# Patient Record
Sex: MALE | Race: WHITE | NOT HISPANIC OR LATINO | Employment: OTHER | URBAN - METROPOLITAN AREA
[De-identification: names, ages, dates, MRNs, and addresses within clinical notes are randomized per-mention and may not be internally consistent; named-entity substitution may affect disease eponyms.]

---

## 2021-02-13 ENCOUNTER — OFFICE VISIT (OUTPATIENT)
Dept: FAMILY MEDICINE | Facility: CLINIC | Age: 61
End: 2021-02-13
Payer: COMMERCIAL

## 2021-02-13 VITALS
DIASTOLIC BLOOD PRESSURE: 72 MMHG | OXYGEN SATURATION: 98 % | WEIGHT: 170.8 LBS | BODY MASS INDEX: 27.45 KG/M2 | TEMPERATURE: 97.6 F | HEIGHT: 66 IN | SYSTOLIC BLOOD PRESSURE: 143 MMHG | HEART RATE: 64 BPM

## 2021-02-13 DIAGNOSIS — K63.5 POLYP OF COLON, UNSPECIFIED PART OF COLON, UNSPECIFIED TYPE: ICD-10-CM

## 2021-02-13 DIAGNOSIS — Z00.00 ENCOUNTER FOR PREVENTIVE CARE: Primary | ICD-10-CM

## 2021-02-13 DIAGNOSIS — N40.0 BENIGN PROSTATIC HYPERPLASIA WITHOUT LOWER URINARY TRACT SYMPTOMS: ICD-10-CM

## 2021-02-13 PROCEDURE — 99386 PREV VISIT NEW AGE 40-64: CPT | Mod: 25 | Performed by: FAMILY MEDICINE

## 2021-02-13 PROCEDURE — 90750 HZV VACC RECOMBINANT IM: CPT | Performed by: FAMILY MEDICINE

## 2021-02-13 PROCEDURE — 90715 TDAP VACCINE 7 YRS/> IM: CPT | Performed by: FAMILY MEDICINE

## 2021-02-13 PROCEDURE — 90471 IMMUNIZATION ADMIN: CPT | Performed by: FAMILY MEDICINE

## 2021-02-13 PROCEDURE — 90472 IMMUNIZATION ADMIN EACH ADD: CPT | Performed by: FAMILY MEDICINE

## 2021-02-13 PROCEDURE — 36415 COLL VENOUS BLD VENIPUNCTURE: CPT | Performed by: FAMILY MEDICINE

## 2021-02-13 RX ORDER — NYSTATIN 100000 [USP'U]/ML
SUSPENSION ORAL
COMMUNITY
Start: 2021-01-26 | End: 2021-06-23

## 2021-02-13 RX ORDER — ALFUZOSIN HYDROCHLORIDE 10 MG/1
10 TABLET, EXTENDED RELEASE ORAL
COMMUNITY
Start: 2021-01-30

## 2021-02-13 ASSESSMENT — ENCOUNTER SYMPTOMS
FATIGUE: 0
BACK PAIN: 0
ARTHRALGIAS: 0
CONSTIPATION: 0
HEADACHES: 0
BRUISES/BLEEDS EASILY: 0
SHORTNESS OF BREATH: 0
NERVOUS/ANXIOUS: 0
POLYPHAGIA: 0
DIARRHEA: 0
POLYDIPSIA: 0
DIFFICULTY URINATING: 0
BLOOD IN STOOL: 0

## 2021-02-13 NOTE — PROGRESS NOTES
[address] [address]  [phone]       Reason for visit:   Chief Complaint   Patient presents with   • NPV- CPE      HPI   Elmer Pardo is a 60 y.o. male who presents with CPE. Her has BPH and colon polyps. He has a history of palpitations. He exercises regularly. 1-2 drinks per day.      Past Medical History:   Diagnosis Date   • Enlarged prostate      Past Surgical History:   Procedure Laterality Date   • ANTERIOR CRUCIATE LIGAMENT REPAIR     • KNEE CARTILAGE SURGERY Left    • KNEE SURGERY       Social History     Tobacco Use   • Smoking status: Former Smoker   • Smokeless tobacco: Never Used   Substance Use Topics   • Alcohol use: Yes   • Drug use: Yes     Types: Marijuana      Family History   Family history unknown: Yes     Patient has no known allergies.  Current Outpatient Medications   Medication Sig Dispense Refill   • alfuzosin (UROXATRAL) 10 mg 24 hr tablet Take 10 mg by mouth once daily.     • nystatin (MYCOSTATIN) 100,000 unit/mL suspension SWALLOW 5 MLS 4 TIMES A DAY       No current facility-administered medications for this visit.        Patient Active Problem List    Diagnosis Date Noted   • Benign prostatic hyperplasia without lower urinary tract symptoms 02/13/2021   • Encounter for preventive care 02/13/2021   • Colon polyp 02/13/2021         Review of Systems   Constitutional: Negative for fatigue.   HENT: Positive for hearing loss.    Eyes: Negative for visual disturbance.   Respiratory: Negative for shortness of breath.    Cardiovascular: Negative for chest pain.   Gastrointestinal: Negative for blood in stool, constipation and diarrhea.   Endocrine: Negative for polydipsia and polyphagia.   Genitourinary: Negative for difficulty urinating.   Musculoskeletal: Negative for arthralgias and back pain.   Skin: Negative for rash.   Neurological: Negative for headaches.   Hematological: Does not bruise/bleed easily.  "  Psychiatric/Behavioral: The patient is not nervous/anxious.      Objective   Vitals:    02/13/21 0936   BP: (!) 143/72   BP Location: Right upper arm   Patient Position: Sitting   Pulse: 64   Temp: 36.4 °C (97.6 °F)   TempSrc: Temporal   SpO2: 98%   Weight: 77.5 kg (170 lb 12.8 oz)   Height: 1.676 m (5' 6\")     Body mass index is 27.57 kg/m².  Physical Exam  Constitutional:       Appearance: Normal appearance.   HENT:      Right Ear: Tympanic membrane and ear canal normal.      Left Ear: Tympanic membrane and ear canal normal.   Eyes:      Extraocular Movements: Extraocular movements intact.      Conjunctiva/sclera: Conjunctivae normal.      Pupils: Pupils are equal, round, and reactive to light.   Neck:      Musculoskeletal: Normal range of motion.      Thyroid: No thyromegaly.      Vascular: No carotid bruit.   Cardiovascular:      Rate and Rhythm: Normal rate and regular rhythm.      Pulses: Normal pulses.      Heart sounds: Normal heart sounds.   Pulmonary:      Effort: Pulmonary effort is normal.      Breath sounds: Normal breath sounds.   Abdominal:      General: Abdomen is flat. Bowel sounds are normal.      Palpations: Abdomen is soft. There is no hepatomegaly or splenomegaly.   Musculoskeletal: Normal range of motion.      Right lower leg: No edema.      Left lower leg: No edema.   Lymphadenopathy:      Cervical: No cervical adenopathy.   Skin:     General: Skin is warm and dry.   Neurological:      General: No focal deficit present.      Mental Status: He is alert and oriented to person, place, and time.   Psychiatric:         Mood and Affect: Mood normal.         Behavior: Behavior normal.         Thought Content: Thought content normal.         Judgment: Judgment normal.         Procedures    No results found for: WBC, HGB, HCT, PLT, CHOL, TRIG, HDL, LDLCALC, ALT, AST, NA, K, CL, CREATININE, BUN, CO2, TSH, PSA, INR, GLUCOSE, HGBA1C, HEPCAB, MICROALBUR        Assessment   Problem List Items Addressed " This Visit        Digestive    Colon polyp     Colonoscopy - Dr German         Relevant Orders    Ambulatory referral to Gastroenterology       Genitourinary    Benign prostatic hyperplasia without lower urinary tract symptoms     Follow with urology  PSA         Relevant Orders    PSA       Other    Encounter for preventive care - Primary     Labs  Tdap  Shinmgrix         Relevant Orders    CBC and Differential    Comprehensive metabolic panel    Lipid panel    PSA    Hemoglobin A1c              Harry A. Frankel, MD  2/13/2021

## 2021-02-14 LAB
ALBUMIN SERPL-MCNC: 4.4 G/DL (ref 3.8–4.9)
ALBUMIN/GLOB SERPL: 2.3 {RATIO} (ref 1.2–2.2)
ALP SERPL-CCNC: 63 IU/L (ref 39–117)
ALT SERPL-CCNC: 24 IU/L (ref 0–44)
AST SERPL-CCNC: 21 IU/L (ref 0–40)
BASOPHILS # BLD AUTO: 0 X10E3/UL (ref 0–0.2)
BASOPHILS NFR BLD AUTO: 0 %
BILIRUB SERPL-MCNC: 0.5 MG/DL (ref 0–1.2)
BUN SERPL-MCNC: 17 MG/DL (ref 6–24)
BUN/CREAT SERPL: 19 (ref 9–20)
CALCIUM SERPL-MCNC: 9.6 MG/DL (ref 8.7–10.2)
CHLORIDE SERPL-SCNC: 101 MMOL/L (ref 96–106)
CHOLEST SERPL-MCNC: 211 MG/DL (ref 100–199)
CO2 SERPL-SCNC: 25 MMOL/L (ref 20–29)
CREAT SERPL-MCNC: 0.88 MG/DL (ref 0.76–1.27)
EOSINOPHIL # BLD AUTO: 0.1 X10E3/UL (ref 0–0.4)
EOSINOPHIL NFR BLD AUTO: 2 %
ERYTHROCYTE [DISTWIDTH] IN BLOOD BY AUTOMATED COUNT: 11.8 % (ref 11.6–15.4)
GLOBULIN SER CALC-MCNC: 1.9 G/DL (ref 1.5–4.5)
GLUCOSE SERPL-MCNC: 121 MG/DL (ref 65–99)
HBA1C MFR BLD: 5.6 % (ref 4.8–5.6)
HCT VFR BLD AUTO: 40.9 % (ref 37.5–51)
HDLC SERPL-MCNC: 87 MG/DL
HGB BLD-MCNC: 13.8 G/DL (ref 13–17.7)
IMM GRANULOCYTES # BLD AUTO: 0 X10E3/UL (ref 0–0.1)
IMM GRANULOCYTES NFR BLD AUTO: 0 %
LAB CORP EGFR IF AFRICN AM: 109 ML/MIN/1.73
LAB CORP EGFR IF NONAFRICN AM: 95 ML/MIN/1.73
LDLC SERPL CALC-MCNC: 117 MG/DL (ref 0–99)
LYMPHOCYTES # BLD AUTO: 1.2 X10E3/UL (ref 0.7–3.1)
LYMPHOCYTES NFR BLD AUTO: 26 %
MCH RBC QN AUTO: 31.3 PG (ref 26.6–33)
MCHC RBC AUTO-ENTMCNC: 33.7 G/DL (ref 31.5–35.7)
MCV RBC AUTO: 93 FL (ref 79–97)
MONOCYTES # BLD AUTO: 0.6 X10E3/UL (ref 0.1–0.9)
MONOCYTES NFR BLD AUTO: 12 %
NEUTROPHILS # BLD AUTO: 2.9 X10E3/UL (ref 1.4–7)
NEUTROPHILS NFR BLD AUTO: 60 %
PLATELET # BLD AUTO: 180 X10E3/UL (ref 150–450)
POTASSIUM SERPL-SCNC: 4.8 MMOL/L (ref 3.5–5.2)
PROT SERPL-MCNC: 6.3 G/DL (ref 6–8.5)
PSA SERPL-MCNC: 0.4 NG/ML (ref 0–4)
RBC # BLD AUTO: 4.41 X10E6/UL (ref 4.14–5.8)
SODIUM SERPL-SCNC: 140 MMOL/L (ref 134–144)
TRIGL SERPL-MCNC: 38 MG/DL (ref 0–149)
VLDLC SERPL CALC-MCNC: 7 MG/DL (ref 5–40)
WBC # BLD AUTO: 4.8 X10E3/UL (ref 3.4–10.8)

## 2021-04-14 DIAGNOSIS — Z23 ENCOUNTER FOR IMMUNIZATION: ICD-10-CM

## 2021-04-20 ENCOUNTER — CLINICAL SUPPORT (OUTPATIENT)
Dept: FAMILY MEDICINE | Facility: CLINIC | Age: 61
End: 2021-04-20
Payer: COMMERCIAL

## 2021-04-20 ENCOUNTER — TELEPHONE (OUTPATIENT)
Dept: FAMILY MEDICINE | Facility: CLINIC | Age: 61
End: 2021-04-20

## 2021-04-20 PROCEDURE — 90750 HZV VACC RECOMBINANT IM: CPT | Performed by: FAMILY MEDICINE

## 2021-04-20 PROCEDURE — 90471 IMMUNIZATION ADMIN: CPT | Performed by: FAMILY MEDICINE

## 2021-04-20 NOTE — TELEPHONE ENCOUNTER
Pt is scheduled to have his 2nd Shingrix vaccine. Can you please place order? His 1st shingrix was 2/13/2021. He completed his Covid 19 vaccination on 3/28/21.

## 2021-06-23 ENCOUNTER — OFFICE VISIT (OUTPATIENT)
Dept: FAMILY MEDICINE | Facility: CLINIC | Age: 61
End: 2021-06-23
Payer: COMMERCIAL

## 2021-06-23 VITALS
DIASTOLIC BLOOD PRESSURE: 79 MMHG | HEIGHT: 66 IN | OXYGEN SATURATION: 97 % | TEMPERATURE: 98.1 F | HEART RATE: 62 BPM | BODY MASS INDEX: 26.48 KG/M2 | SYSTOLIC BLOOD PRESSURE: 132 MMHG | WEIGHT: 164.8 LBS

## 2021-06-23 DIAGNOSIS — S70.01XA CONTUSION OF RIGHT HIP, INITIAL ENCOUNTER: Primary | ICD-10-CM

## 2021-06-23 PROCEDURE — 99213 OFFICE O/P EST LOW 20 MIN: CPT | Performed by: FAMILY MEDICINE

## 2021-06-23 PROCEDURE — 3008F BODY MASS INDEX DOCD: CPT | Performed by: FAMILY MEDICINE

## 2021-06-23 ASSESSMENT — ENCOUNTER SYMPTOMS
ARTHRALGIAS: 1
BACK PAIN: 1
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
## 2022-02-14 ENCOUNTER — OFFICE VISIT (OUTPATIENT)
Dept: FAMILY MEDICINE | Facility: CLINIC | Age: 62
End: 2022-02-14
Payer: COMMERCIAL

## 2022-02-14 VITALS
HEIGHT: 66 IN | HEART RATE: 66 BPM | BODY MASS INDEX: 26.65 KG/M2 | OXYGEN SATURATION: 97 % | WEIGHT: 165.8 LBS | DIASTOLIC BLOOD PRESSURE: 76 MMHG | TEMPERATURE: 98 F | SYSTOLIC BLOOD PRESSURE: 143 MMHG

## 2022-02-14 DIAGNOSIS — Z00.00 ENCOUNTER FOR PREVENTIVE CARE: Primary | ICD-10-CM

## 2022-02-14 DIAGNOSIS — N40.0 BENIGN PROSTATIC HYPERPLASIA WITHOUT LOWER URINARY TRACT SYMPTOMS: ICD-10-CM

## 2022-02-14 DIAGNOSIS — K63.5 POLYP OF COLON, UNSPECIFIED PART OF COLON, UNSPECIFIED TYPE: ICD-10-CM

## 2022-02-14 PROCEDURE — 36415 COLL VENOUS BLD VENIPUNCTURE: CPT | Performed by: FAMILY MEDICINE

## 2022-02-14 PROCEDURE — 90471 IMMUNIZATION ADMIN: CPT | Performed by: FAMILY MEDICINE

## 2022-02-14 PROCEDURE — 90686 IIV4 VACC NO PRSV 0.5 ML IM: CPT | Performed by: FAMILY MEDICINE

## 2022-02-14 PROCEDURE — 3008F BODY MASS INDEX DOCD: CPT | Performed by: FAMILY MEDICINE

## 2022-02-14 PROCEDURE — 99396 PREV VISIT EST AGE 40-64: CPT | Mod: 25 | Performed by: FAMILY MEDICINE

## 2022-02-14 ASSESSMENT — ENCOUNTER SYMPTOMS
SHORTNESS OF BREATH: 0
FATIGUE: 0
POLYPHAGIA: 0
BRUISES/BLEEDS EASILY: 0
HEADACHES: 0
CONSTIPATION: 0
BACK PAIN: 0
BLOOD IN STOOL: 0
DIARRHEA: 0
NERVOUS/ANXIOUS: 0
POLYDIPSIA: 0
ARTHRALGIAS: 0

## 2022-02-14 NOTE — PROGRESS NOTES
[address] [address]  [phone]       Reason for visit:   Chief Complaint   Patient presents with   • Annual Exam      HPI   Elmer Pardo is a 61 y.o. male who presents with CPE.He has colon polyps. He has BPH. He is exercising. Diet has improved.      Past Medical History:   Diagnosis Date   • Enlarged prostate      Past Surgical History:   Procedure Laterality Date   • ANTERIOR CRUCIATE LIGAMENT REPAIR     • KNEE CARTILAGE SURGERY Left    • KNEE SURGERY       Social History     Tobacco Use   • Smoking status: Former Smoker   • Smokeless tobacco: Never Used   Substance Use Topics   • Alcohol use: Yes   • Drug use: Yes     Types: Marijuana      Family History   Family history unknown: Yes     Patient has no known allergies.  Current Outpatient Medications   Medication Sig Dispense Refill   • alfuzosin (UROXATRAL) 10 mg 24 hr tablet Take 10 mg by mouth once daily.       No current facility-administered medications for this visit.       Patient Active Problem List    Diagnosis Date Noted   • Contusion of right hip 06/23/2021   • Benign prostatic hyperplasia without lower urinary tract symptoms 02/13/2021   • Encounter for preventive care 02/13/2021   • Colon polyp 02/13/2021         Review of Systems   Constitutional: Negative for fatigue.   HENT: Negative for hearing loss.    Eyes: Negative for visual disturbance.   Respiratory: Negative for shortness of breath.    Cardiovascular: Negative for chest pain.   Gastrointestinal: Negative for blood in stool, constipation and diarrhea.   Endocrine: Negative for polydipsia, polyphagia and polyuria.   Musculoskeletal: Negative for arthralgias and back pain.   Skin: Negative for rash.   Neurological: Negative for headaches.   Hematological: Does not bruise/bleed easily.   Psychiatric/Behavioral: The patient is not nervous/anxious.      Objective   Vitals:    02/14/22 1009   BP: (!) 143/76   BP  "Location: Right upper arm   Patient Position: Sitting   Pulse: 66   Temp: 36.7 °C (98 °F)   TempSrc: Oral   SpO2: 97%   Weight: 75.2 kg (165 lb 12.8 oz)   Height: 1.676 m (5' 6\")     Body mass index is 26.76 kg/m².  Physical Exam  Constitutional:       Appearance: Normal appearance.   HENT:      Right Ear: Tympanic membrane and ear canal normal.      Left Ear: Tympanic membrane and ear canal normal.   Eyes:      Extraocular Movements: Extraocular movements intact.      Conjunctiva/sclera: Conjunctivae normal.      Pupils: Pupils are equal, round, and reactive to light.   Neck:      Thyroid: No thyromegaly.      Vascular: No carotid bruit.   Cardiovascular:      Rate and Rhythm: Normal rate and regular rhythm.      Pulses: Normal pulses.      Heart sounds: Normal heart sounds.   Pulmonary:      Effort: Pulmonary effort is normal.      Breath sounds: Normal breath sounds.   Abdominal:      General: Abdomen is flat. Bowel sounds are normal.      Palpations: Abdomen is soft. There is no hepatomegaly or splenomegaly.   Musculoskeletal:         General: Normal range of motion.      Cervical back: Normal range of motion.      Right lower leg: No edema.      Left lower leg: No edema.   Lymphadenopathy:      Cervical: No cervical adenopathy.   Skin:     General: Skin is warm and dry.   Neurological:      General: No focal deficit present.      Mental Status: He is alert and oriented to person, place, and time.   Psychiatric:         Mood and Affect: Mood normal.         Behavior: Behavior normal.         Thought Content: Thought content normal.         Judgment: Judgment normal.         Procedures    Lab Results   Component Value Date    WBC 4.8 02/13/2021    HGB 13.8 02/13/2021    HCT 40.9 02/13/2021     02/13/2021    CHOL 211 (H) 02/13/2021    TRIG 38 02/13/2021    HDL 87 02/13/2021    LDLCALC 117 (H) 02/13/2021    ALT 24 02/13/2021    AST 21 02/13/2021     02/13/2021    K 4.8 02/13/2021     02/13/2021 "    CREATININE 0.88 02/13/2021    BUN 17 02/13/2021    CO2 25 02/13/2021    PSA 0.4 02/13/2021    GLUCOSE 121 (H) 02/13/2021    HGBA1C 5.6 02/13/2021           Assessment   Problem List Items Addressed This Visit        Digestive    Colon polyp     Colonoscopy 2026            Genitourinary    Benign prostatic hyperplasia without lower urinary tract symptoms     Continue meds  PSA  Follow with urology              Other    Encounter for preventive care - Primary     Labs  Flu vax         Relevant Orders    CBC and Differential    Comprehensive metabolic panel    Lipid panel    PSA    Hemoglobin A1c    Hepatitis C antibody              Harry A. Frankel, MD  2/14/2022

## 2022-02-15 LAB
ALBUMIN SERPL-MCNC: 4.6 G/DL (ref 3.8–4.9)
ALBUMIN/GLOB SERPL: 2.1 {RATIO} (ref 1.2–2.2)
ALP SERPL-CCNC: 70 IU/L (ref 44–121)
ALT SERPL-CCNC: 25 IU/L (ref 0–44)
AST SERPL-CCNC: 23 IU/L (ref 0–40)
BASOPHILS # BLD AUTO: 0 X10E3/UL (ref 0–0.2)
BASOPHILS NFR BLD AUTO: 1 %
BILIRUB SERPL-MCNC: 0.6 MG/DL (ref 0–1.2)
BUN SERPL-MCNC: 19 MG/DL (ref 6–24)
BUN/CREAT SERPL: 22 (ref 9–20)
CALCIUM SERPL-MCNC: 10 MG/DL (ref 8.7–10.2)
CHLORIDE SERPL-SCNC: 100 MMOL/L (ref 96–106)
CHOLEST SERPL-MCNC: 217 MG/DL (ref 100–199)
CO2 SERPL-SCNC: 25 MMOL/L (ref 20–29)
CREAT SERPL-MCNC: 0.87 MG/DL (ref 0.76–1.27)
EOSINOPHIL # BLD AUTO: 0.1 X10E3/UL (ref 0–0.4)
EOSINOPHIL NFR BLD AUTO: 2 %
ERYTHROCYTE [DISTWIDTH] IN BLOOD BY AUTOMATED COUNT: 11.8 % (ref 11.6–15.4)
GLOBULIN SER CALC-MCNC: 2.2 G/DL (ref 1.5–4.5)
GLUCOSE SERPL-MCNC: 128 MG/DL (ref 65–99)
HBA1C MFR BLD: 5.9 % (ref 4.8–5.6)
HCT VFR BLD AUTO: 41.6 % (ref 37.5–51)
HCV AB S/CO SERPL IA: <0.1 S/CO RATIO (ref 0–0.9)
HDLC SERPL-MCNC: 98 MG/DL
HGB BLD-MCNC: 14.7 G/DL (ref 13–17.7)
IMM GRANULOCYTES # BLD AUTO: 0 X10E3/UL (ref 0–0.1)
IMM GRANULOCYTES NFR BLD AUTO: 1 %
LAB CORP EGFR IF AFRICN AM: 109 ML/MIN/1.73
LAB CORP EGFR IF NONAFRICN AM: 94 ML/MIN/1.73
LDLC SERPL CALC-MCNC: 111 MG/DL (ref 0–99)
LYMPHOCYTES # BLD AUTO: 1.1 X10E3/UL (ref 0.7–3.1)
LYMPHOCYTES NFR BLD AUTO: 28 %
MCH RBC QN AUTO: 32.2 PG (ref 26.6–33)
MCHC RBC AUTO-ENTMCNC: 35.3 G/DL (ref 31.5–35.7)
MCV RBC AUTO: 91 FL (ref 79–97)
MONOCYTES # BLD AUTO: 0.4 X10E3/UL (ref 0.1–0.9)
MONOCYTES NFR BLD AUTO: 11 %
NEUTROPHILS # BLD AUTO: 2.3 X10E3/UL (ref 1.4–7)
NEUTROPHILS NFR BLD AUTO: 57 %
PLATELET # BLD AUTO: 156 X10E3/UL (ref 150–450)
POTASSIUM SERPL-SCNC: 5.5 MMOL/L (ref 3.5–5.2)
PROT SERPL-MCNC: 6.8 G/DL (ref 6–8.5)
PSA SERPL-MCNC: 0.4 NG/ML (ref 0–4)
RBC # BLD AUTO: 4.56 X10E6/UL (ref 4.14–5.8)
SODIUM SERPL-SCNC: 140 MMOL/L (ref 134–144)
TRIGL SERPL-MCNC: 43 MG/DL (ref 0–149)
VLDLC SERPL CALC-MCNC: 8 MG/DL (ref 5–40)
WBC # BLD AUTO: 3.9 X10E3/UL (ref 3.4–10.8)

## 2022-02-18 ENCOUNTER — TELEMEDICINE (OUTPATIENT)
Dept: FAMILY MEDICINE | Facility: CLINIC | Age: 62
End: 2022-02-18
Payer: COMMERCIAL

## 2022-02-18 DIAGNOSIS — M54.50 ACUTE LEFT-SIDED LOW BACK PAIN WITHOUT SCIATICA: Primary | ICD-10-CM

## 2022-02-18 PROCEDURE — 99213 OFFICE O/P EST LOW 20 MIN: CPT | Mod: 95 | Performed by: FAMILY MEDICINE

## 2022-02-18 RX ORDER — CYCLOBENZAPRINE HCL 10 MG
10 TABLET ORAL NIGHTLY PRN
Qty: 20 TABLET | Refills: 0 | Status: SHIPPED | OUTPATIENT
Start: 2022-02-18 | End: 2022-07-19

## 2022-02-18 ASSESSMENT — ENCOUNTER SYMPTOMS: BACK PAIN: 1

## 2022-02-18 NOTE — PROGRESS NOTES
Verification of Patient Location:  The patient affirms they are currently located in the following state: [place]    Request for Consent:    Audio and Video Encounter   Deangelo, my name is Harry A. Frankel, MD.  Before we proceed, can you please verify your identification by telling me your full name and date of birth?  Can you tell me who is in the room with you?    You and I are about to have a telemedicine check-in or visit because you have requested it.  This is a live video-conference.  I am a real person, speaking to you in real time.  There is no one else with me on the video-conference.  However, when we use ([employer], [employer], etc) it is important for you to know that the video-conference may not be secure or private.  I am not recording this conversation and I am asking you not to record it.  This telemedicine visit will be billed to your health insurance or you, if you are self-insured.  You understand you will be responsible for any copayments or coinsurances that apply to your telemedicine visit.  Communication platform used for this encounter:  [employer]     Before starting our telemedicine visit, I am required to get your consent for this virtual check-in or visit by telemedicine. Do you consent?      Patient Response to Request for Consent:  Yes      Visit Documentation:  Subjective     Patient ID: Elmer Pardo is a 61 y.o. male.  1960      60 yo male with 3 days of lower back pain, L>R. No radiation. He is able to use a [place]. It is worse at night when it tightens up on him. He has just started to use ice.      The following have been reviewed and updated as appropriate in this visit:   Tobacco  Allergies  Meds  Problems  Med Hx  Surg Hx  Fam Hx       Review of Systems   Musculoskeletal: Positive for back pain.         Assessment/Plan   Diagnoses and all orders for this visit:    Acute left-sided low back pain without sciatica (Primary)  Assessment & Plan:  Stretching  Ice Advil 600  tid   Flexeril 10 hs  TC PT if fails to resolve        Time Spent:  I spent 11 minutes on this date of service performing the following activities: obtaining history, entering orders and documenting.

## 2022-07-19 ENCOUNTER — OFFICE VISIT (OUTPATIENT)
Dept: FAMILY MEDICINE | Facility: CLINIC | Age: 62
End: 2022-07-19
Payer: COMMERCIAL

## 2022-07-19 VITALS
SYSTOLIC BLOOD PRESSURE: 147 MMHG | HEART RATE: 98 BPM | OXYGEN SATURATION: 98 % | HEIGHT: 66 IN | TEMPERATURE: 98.5 F | BODY MASS INDEX: 26.26 KG/M2 | DIASTOLIC BLOOD PRESSURE: 92 MMHG | WEIGHT: 163.4 LBS

## 2022-07-19 DIAGNOSIS — R10.11 RIGHT UPPER QUADRANT ABDOMINAL PAIN: Primary | ICD-10-CM

## 2022-07-19 DIAGNOSIS — R73.03 PREDIABETES: ICD-10-CM

## 2022-07-19 PROCEDURE — 36415 COLL VENOUS BLD VENIPUNCTURE: CPT | Performed by: FAMILY MEDICINE

## 2022-07-19 PROCEDURE — 3008F BODY MASS INDEX DOCD: CPT | Performed by: FAMILY MEDICINE

## 2022-07-19 PROCEDURE — 99214 OFFICE O/P EST MOD 30 MIN: CPT | Mod: 25 | Performed by: FAMILY MEDICINE

## 2022-07-19 ASSESSMENT — ENCOUNTER SYMPTOMS
VOMITING: 0
NAUSEA: 0
ABDOMINAL PAIN: 1

## 2022-07-19 NOTE — PROGRESS NOTES
[address] [address]  [phone]       Reason for visit:   Chief Complaint   Patient presents with   • Illness      HPI   Elmer Pardo is a 61 y.o. male who presents with abdominal pain. 3 weeks. Relates to alcohol use. He is having episodes since he stopped drinking alcohol. He calls it a discomfort. Pain level was 3/10      Past Medical History:   Diagnosis Date   • Enlarged prostate      Past Surgical History:   Procedure Laterality Date   • ANTERIOR CRUCIATE LIGAMENT REPAIR     • KNEE CARTILAGE SURGERY Left    • KNEE SURGERY       Social History     Tobacco Use   • Smoking status: Former Smoker   • Smokeless tobacco: Never Used   Substance Use Topics   • Alcohol use: Yes   • Drug use: Yes     Types: Marijuana      Social History     Social History Narrative    Do you wear your seatbelt? Yes    Do you have smoke detector in your home? Yes    Do you have a carbon monoxide detector in your home? 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      Family History   Family history unknown: Yes     Patient has no known allergies.  Current Outpatient Medications   Medication Sig Dispense Refill   • alfuzosin (UROXATRAL) 10 mg 24 hr tablet Take 10 mg by mouth once daily.       No current facility-administered medications for this visit.       Patient Active Problem List    Diagnosis Date Noted   • Right upper quadrant abdominal pain 07/19/2022   • Prediabetes 07/19/2022   • Acute left-sided low back pain without sciatica 02/18/2022   • Contusion of right hip 06/23/2021   • Benign prostatic hyperplasia without lower urinary tract symptoms 02/13/2021   • Encounter for preventive care 02/13/2021   • Colon polyp 02/13/2021         Review of Systems   Gastrointestinal: Positive for abdominal pain. Negative for nausea and vomiting.     Objective   Vitals:    07/19/22 0841   BP: (!) 147/92   BP Location: Right upper arm  "  Patient Position: Sitting   Pulse: 98   Temp: 36.9 °C (98.5 °F)   TempSrc: Oral   SpO2: 98%   Weight: 74.1 kg (163 lb 6.4 oz)   Height: 1.676 m (5' 6\")     Body mass index is 26.37 kg/m².  Physical Exam  Abdominal:      General: Abdomen is flat. Bowel sounds are normal.      Palpations: Abdomen is soft. There is no hepatomegaly or splenomegaly.         Procedures    Lab Results   Component Value Date    WBC 3.9 02/14/2022    HGB 14.7 02/14/2022    HCT 41.6 02/14/2022     02/14/2022    CHOL 217 (H) 02/14/2022    TRIG 43 02/14/2022    HDL 98 02/14/2022    LDLCALC 111 (H) 02/14/2022    ALT 25 02/14/2022    AST 23 02/14/2022     02/14/2022    K 5.5 (H) 02/14/2022     02/14/2022    CREATININE 0.87 02/14/2022    BUN 19 02/14/2022    CO2 25 02/14/2022    PSA 0.4 02/14/2022    GLUCOSE 128 (H) 02/14/2022    HGBA1C 5.9 (H) 02/14/2022    HEPCAB <0.1 02/14/2022           Assessment   Problem List Items Addressed This Visit        Nervous    Right upper quadrant abdominal pain - Primary     New  Labs  US abdominal           Relevant Orders    Comprehensive metabolic panel    CBC and Differential    ULTRASOUND ABDOMEN COMPLETE    Lipase       Endocrine/Metabolic    Prediabetes     Continue diet  Labs           Relevant Orders    Hemoglobin A1c              Harry A. Frankel, MD  7/19/2022                   "

## 2022-07-20 LAB
ALBUMIN SERPL-MCNC: 4.9 G/DL (ref 3.8–4.9)
ALBUMIN/GLOB SERPL: 2.7 {RATIO} (ref 1.2–2.2)
ALP SERPL-CCNC: 76 IU/L (ref 44–121)
ALT SERPL-CCNC: 18 IU/L (ref 0–44)
AST SERPL-CCNC: 17 IU/L (ref 0–40)
BASOPHILS # BLD AUTO: 0 X10E3/UL (ref 0–0.2)
BASOPHILS NFR BLD AUTO: 1 %
BILIRUB SERPL-MCNC: 0.5 MG/DL (ref 0–1.2)
BUN SERPL-MCNC: 20 MG/DL (ref 6–24)
BUN/CREAT SERPL: 22 (ref 9–20)
CALCIUM SERPL-MCNC: 10.3 MG/DL (ref 8.7–10.2)
CHLORIDE SERPL-SCNC: 101 MMOL/L (ref 96–106)
CO2 SERPL-SCNC: 26 MMOL/L (ref 20–29)
CREAT SERPL-MCNC: 0.91 MG/DL (ref 0.76–1.27)
EGFRCR SERPLBLD CKD-EPI 2021: 97 ML/MIN/1.73
EOSINOPHIL # BLD AUTO: 0.1 X10E3/UL (ref 0–0.4)
EOSINOPHIL NFR BLD AUTO: 2 %
ERYTHROCYTE [DISTWIDTH] IN BLOOD BY AUTOMATED COUNT: 12.2 % (ref 11.6–15.4)
GLOBULIN SER CALC-MCNC: 1.8 G/DL (ref 1.5–4.5)
GLUCOSE SERPL-MCNC: 129 MG/DL (ref 65–99)
HBA1C MFR BLD: 5.9 % (ref 4.8–5.6)
HCT VFR BLD AUTO: 45.5 % (ref 37.5–51)
HGB BLD-MCNC: 15.7 G/DL (ref 13–17.7)
IMM GRANULOCYTES # BLD AUTO: 0 X10E3/UL (ref 0–0.1)
IMM GRANULOCYTES NFR BLD AUTO: 0 %
LIPASE SERPL-CCNC: 155 U/L (ref 13–78)
LYMPHOCYTES # BLD AUTO: 1.3 X10E3/UL (ref 0.7–3.1)
LYMPHOCYTES NFR BLD AUTO: 27 %
MCH RBC QN AUTO: 31.9 PG (ref 26.6–33)
MCHC RBC AUTO-ENTMCNC: 34.5 G/DL (ref 31.5–35.7)
MCV RBC AUTO: 93 FL (ref 79–97)
MONOCYTES # BLD AUTO: 0.4 X10E3/UL (ref 0.1–0.9)
MONOCYTES NFR BLD AUTO: 9 %
NEUTROPHILS # BLD AUTO: 2.9 X10E3/UL (ref 1.4–7)
NEUTROPHILS NFR BLD AUTO: 61 %
PLATELET # BLD AUTO: 157 X10E3/UL (ref 150–450)
POTASSIUM SERPL-SCNC: 5.1 MMOL/L (ref 3.5–5.2)
PROT SERPL-MCNC: 6.7 G/DL (ref 6–8.5)
RBC # BLD AUTO: 4.92 X10E6/UL (ref 4.14–5.8)
SODIUM SERPL-SCNC: 141 MMOL/L (ref 134–144)
WBC # BLD AUTO: 4.8 X10E3/UL (ref 3.4–10.8)

## 2022-07-21 ENCOUNTER — TELEPHONE (OUTPATIENT)
Dept: FAMILY MEDICINE | Facility: CLINIC | Age: 62
End: 2022-07-21
Payer: COMMERCIAL

## 2022-07-21 ENCOUNTER — HOSPITAL ENCOUNTER (OUTPATIENT)
Dept: RADIOLOGY | Facility: CLINIC | Age: 62
Discharge: HOME | End: 2022-07-21
Attending: FAMILY MEDICINE
Payer: COMMERCIAL

## 2022-07-21 DIAGNOSIS — K85.90 ACUTE PANCREATITIS WITHOUT INFECTION OR NECROSIS, UNSPECIFIED PANCREATITIS TYPE: ICD-10-CM

## 2022-07-21 DIAGNOSIS — R10.11 RIGHT UPPER QUADRANT ABDOMINAL PAIN: ICD-10-CM

## 2022-07-21 DIAGNOSIS — R16.0 LIVER MASS: Primary | ICD-10-CM

## 2022-07-21 PROCEDURE — 76700 US EXAM ABDOM COMPLETE: CPT

## 2022-07-21 NOTE — TELEPHONE ENCOUNTER
Pre cert approved.  They do not do CT scans or MRI's at [facility]. I did precert for Ocsar Milner. I called patient and he is aware and will schedule          Order Request Summary  Health Plan:  [employer]   Scheduled Date of Service:  7/21/2022      Order ID: [id number]       Authorized    Approval Valid Through: 07/21/2022 - 09/18/2022  This order is not a guarantee of payment except when required by applicable law. When applicable law allows, payment is subject to the member’s active enrollment, benefit limitation and other terms of the member’s contract at the time of services provided.  Member Information:  DORON BRYANT  Member #: [id number]  [address] [place] [id number]  YOB: 1960  Phone: [phone]  Ordering Provider:  FRANKEL , HARRY  [address] [facility] [address] [facility] [address]  [place] [id number]  Phone: [phone]  Fax: [phone]  NPI: [id number]  Servicing Provider:   [facility]  [address]    [place] [address]  Phone: [phone]  Fax: [phone]  NPI: [id number]  TIN: [id number]        The information below was obtained from the Ordering Provider and has not been independently verified by [facility]. [facility] assumes no responsibility for the accuracy of this information or for its consistency with the patient's medical record.  Please call [phone] for all Urgent Requests.     REQUESTED EXAMS   EXAM REQUEST STATUS REASON ACTION   CT Abdomen/Pelvis Combination   With Contrast    Authorized  Criteria Met  Review Exam Withdraw Exam  mansoor for Oscar Milner

## 2022-07-26 ENCOUNTER — HOSPITAL ENCOUNTER (OUTPATIENT)
Dept: RADIOLOGY | Facility: CLINIC | Age: 62
Discharge: HOME | End: 2022-07-26
Attending: FAMILY MEDICINE
Payer: COMMERCIAL

## 2022-07-26 DIAGNOSIS — K85.90 ACUTE PANCREATITIS WITHOUT INFECTION OR NECROSIS, UNSPECIFIED PANCREATITIS TYPE: ICD-10-CM

## 2022-07-26 DIAGNOSIS — R16.0 LIVER MASS: ICD-10-CM

## 2022-07-26 PROCEDURE — 25500000 HC DRUGS/INCIDENT RAD: Performed by: FAMILY MEDICINE

## 2022-07-26 PROCEDURE — G1004 CDSM NDSC: HCPCS

## 2022-07-26 PROCEDURE — 63600105 HC IODINE BASED CONTRAST: Performed by: FAMILY MEDICINE

## 2022-07-26 RX ADMIN — IOHEXOL 100 ML: 350 INJECTION, SOLUTION INTRAVENOUS at 11:06

## 2022-07-26 RX ADMIN — BARIUM SULFATE 900 ML: 21 SUSPENSION ORAL at 11:06

## 2023-02-14 ENCOUNTER — OFFICE VISIT (OUTPATIENT)
Dept: FAMILY MEDICINE | Facility: CLINIC | Age: 63
End: 2023-02-14
Payer: COMMERCIAL

## 2023-02-14 VITALS
HEART RATE: 108 BPM | WEIGHT: 153 LBS | HEIGHT: 66 IN | SYSTOLIC BLOOD PRESSURE: 137 MMHG | OXYGEN SATURATION: 99 % | DIASTOLIC BLOOD PRESSURE: 86 MMHG | TEMPERATURE: 98.4 F | BODY MASS INDEX: 24.59 KG/M2

## 2023-02-14 DIAGNOSIS — R10.11 RIGHT UPPER QUADRANT ABDOMINAL PAIN: ICD-10-CM

## 2023-02-14 DIAGNOSIS — N40.0 BENIGN PROSTATIC HYPERPLASIA WITHOUT LOWER URINARY TRACT SYMPTOMS: ICD-10-CM

## 2023-02-14 DIAGNOSIS — Z00.00 ENCOUNTER FOR PREVENTIVE CARE: Primary | ICD-10-CM

## 2023-02-14 PROCEDURE — 90471 IMMUNIZATION ADMIN: CPT | Performed by: FAMILY MEDICINE

## 2023-02-14 PROCEDURE — 36415 COLL VENOUS BLD VENIPUNCTURE: CPT | Performed by: FAMILY MEDICINE

## 2023-02-14 PROCEDURE — 99396 PREV VISIT EST AGE 40-64: CPT | Mod: 25 | Performed by: FAMILY MEDICINE

## 2023-02-14 PROCEDURE — 3008F BODY MASS INDEX DOCD: CPT | Performed by: FAMILY MEDICINE

## 2023-02-14 PROCEDURE — 90686 IIV4 VACC NO PRSV 0.5 ML IM: CPT | Performed by: FAMILY MEDICINE

## 2023-02-14 ASSESSMENT — ENCOUNTER SYMPTOMS
BACK PAIN: 0
SHORTNESS OF BREATH: 0
PALPITATIONS: 0
FATIGUE: 0
DIARRHEA: 0
ARTHRALGIAS: 0
CONSTIPATION: 0
BLOOD IN STOOL: 0
NERVOUS/ANXIOUS: 0
POLYDIPSIA: 0
HEADACHES: 0
BRUISES/BLEEDS EASILY: 0
POLYPHAGIA: 0

## 2023-02-14 NOTE — PROGRESS NOTES
[address] [address]  [phone]       Reason for visit:   Chief Complaint   Patient presents with   • Annual Exam      HPI   Elmer Pardo is a 62 y.o. male who presents with CPE. He had a pancreatitis 7/1/22. He has changed his diet and is eating healthier. He feels well. He exercises.      Past Medical History:   Diagnosis Date   • Enlarged prostate      Past Surgical History:   Procedure Laterality Date   • ANTERIOR CRUCIATE LIGAMENT REPAIR     • KNEE CARTILAGE SURGERY Left    • KNEE SURGERY       Social History     Tobacco Use   • Smoking status: Former   • Smokeless tobacco: Never   Substance Use Topics   • Alcohol use: Yes   • Drug use: Yes     Types: Marijuana      Social History     Social History Narrative    Do you wear your seatbelt? Yes    Do you have smoke detector in your home? Yes    Do you have a carbon monoxide detector in your home? 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      Family History   Family history unknown: Yes     Patient has no known allergies.  Current Outpatient Medications   Medication Sig Dispense Refill   • alfuzosin (UROXATRAL) 10 mg 24 hr tablet Take 10 mg by mouth once daily.       No current facility-administered medications for this visit.       Patient Active Problem List    Diagnosis Date Noted   • Encounter for preventive care 02/14/2023   • Liver mass 07/21/2022   • Right upper quadrant abdominal pain 07/19/2022   • Prediabetes 07/19/2022   • Acute left-sided low back pain without sciatica 02/18/2022   • Contusion of right hip 06/23/2021   • Benign prostatic hyperplasia without lower urinary tract symptoms 02/13/2021   • Colon polyp 02/13/2021         Review of Systems   Constitutional: Negative for fatigue.   HENT: Negative for hearing loss.    Eyes: Negative for visual disturbance.   Respiratory: Negative for shortness of breath.    Cardiovascular: Negative  "for chest pain, palpitations and leg swelling.   Gastrointestinal: Negative for blood in stool, constipation and diarrhea.   Endocrine: Negative for polydipsia, polyphagia and polyuria.   Musculoskeletal: Negative for arthralgias and back pain.   Skin: Negative for rash.   Neurological: Negative for headaches.   Hematological: Does not bruise/bleed easily.   Psychiatric/Behavioral: The patient is not nervous/anxious.      Objective   Vitals:    02/14/23 1014   BP: 137/86   BP Location: Right upper arm   Patient Position: Sitting   Pulse: (!) 108   Temp: 36.9 °C (98.4 °F)   TempSrc: Oral   SpO2: 99%   Weight: 69.4 kg (153 lb)  Comment: w/ shoes   Height: 1.676 m (5' 6\")     Body mass index is 24.69 kg/m².  Physical Exam  Constitutional:       Appearance: Normal appearance.   HENT:      Right Ear: Tympanic membrane and ear canal normal.      Left Ear: Tympanic membrane and ear canal normal.   Eyes:      Extraocular Movements: Extraocular movements intact.      Conjunctiva/sclera: Conjunctivae normal.      Pupils: Pupils are equal, round, and reactive to light.   Neck:      Thyroid: No thyromegaly.      Vascular: No carotid bruit.   Cardiovascular:      Rate and Rhythm: Normal rate and regular rhythm.      Pulses: Normal pulses.      Heart sounds: Normal heart sounds.   Pulmonary:      Effort: Pulmonary effort is normal.      Breath sounds: Normal breath sounds.   Abdominal:      General: Abdomen is flat. Bowel sounds are normal.      Palpations: Abdomen is soft. There is no hepatomegaly or splenomegaly.   Musculoskeletal:         General: Normal range of motion.      Cervical back: Normal range of motion.      Right lower leg: No edema.      Left lower leg: No edema.   Lymphadenopathy:      Cervical: No cervical adenopathy.   Skin:     General: Skin is warm and dry.   Neurological:      General: No focal deficit present.      Mental Status: He is alert and oriented to person, place, and time.      Deep Tendon " Reflexes: Reflexes normal.   Psychiatric:         Mood and Affect: Mood normal.         Behavior: Behavior normal.         Thought Content: Thought content normal.         Judgment: Judgment normal.         Procedures    Lab Results   Component Value Date    WBC 4.8 07/19/2022    HGB 15.7 07/19/2022    HCT 45.5 07/19/2022     07/19/2022    CHOL 217 (H) 02/14/2022    TRIG 43 02/14/2022    HDL 98 02/14/2022    LDLCALC 111 (H) 02/14/2022    ALT 18 07/19/2022    AST 17 07/19/2022     07/19/2022    K 5.1 07/19/2022     07/19/2022    CREATININE 0.91 07/19/2022    BUN 20 07/19/2022    CO2 26 07/19/2022    PSA 0.4 02/14/2022    GLUCOSE 129 (H) 07/19/2022    HGBA1C 5.9 (H) 07/19/2022    HEPCAB <0.1 02/14/2022           Assessment   Problem List Items Addressed This Visit        Nervous    Right upper quadrant abdominal pain     Pancreatitis         Relevant Orders    Lipase       Genitourinary    Benign prostatic hyperplasia without lower urinary tract symptoms     Continue meds  PSA            Other    Encounter for preventive care - Primary     Labs  Flu vax         Relevant Orders    CBC and Differential    Comprehensive metabolic panel    Lipid panel    PSA    Hemoglobin A1c           Harry A. Frankel, MD  2/14/2023

## 2023-02-15 LAB
ALBUMIN SERPL-MCNC: 4.7 G/DL (ref 3.8–4.9)
ALBUMIN/GLOB SERPL: 2.2 {RATIO} (ref 1.2–2.2)
ALP SERPL-CCNC: 85 IU/L (ref 44–121)
ALT SERPL-CCNC: 33 IU/L (ref 0–44)
AST SERPL-CCNC: 25 IU/L (ref 0–40)
BASOPHILS # BLD AUTO: 0 X10E3/UL (ref 0–0.2)
BASOPHILS NFR BLD AUTO: 1 %
BILIRUB SERPL-MCNC: 0.7 MG/DL (ref 0–1.2)
BUN SERPL-MCNC: 16 MG/DL (ref 8–27)
BUN/CREAT SERPL: 16 (ref 10–24)
CALCIUM SERPL-MCNC: 10.3 MG/DL (ref 8.6–10.2)
CHLORIDE SERPL-SCNC: 101 MMOL/L (ref 96–106)
CHOLEST SERPL-MCNC: 190 MG/DL (ref 100–199)
CO2 SERPL-SCNC: 23 MMOL/L (ref 20–29)
CREAT SERPL-MCNC: 0.98 MG/DL (ref 0.76–1.27)
EGFRCR SERPLBLD CKD-EPI 2021: 88 ML/MIN/1.73
EOSINOPHIL # BLD AUTO: 0.1 X10E3/UL (ref 0–0.4)
EOSINOPHIL NFR BLD AUTO: 1 %
ERYTHROCYTE [DISTWIDTH] IN BLOOD BY AUTOMATED COUNT: 11.8 % (ref 11.6–15.4)
GLOBULIN SER CALC-MCNC: 2.1 G/DL (ref 1.5–4.5)
GLUCOSE SERPL-MCNC: 120 MG/DL (ref 70–99)
HBA1C MFR BLD: 5.6 % (ref 4.8–5.6)
HCT VFR BLD AUTO: 42.7 % (ref 37.5–51)
HDLC SERPL-MCNC: 69 MG/DL
HGB BLD-MCNC: 14.9 G/DL (ref 13–17.7)
IMM GRANULOCYTES # BLD AUTO: 0 X10E3/UL (ref 0–0.1)
IMM GRANULOCYTES NFR BLD AUTO: 0 %
LDLC SERPL CALC-MCNC: 113 MG/DL (ref 0–99)
LIPASE SERPL-CCNC: 61 U/L (ref 13–78)
LYMPHOCYTES # BLD AUTO: 1.1 X10E3/UL (ref 0.7–3.1)
LYMPHOCYTES NFR BLD AUTO: 21 %
MCH RBC QN AUTO: 32.1 PG (ref 26.6–33)
MCHC RBC AUTO-ENTMCNC: 34.9 G/DL (ref 31.5–35.7)
MCV RBC AUTO: 92 FL (ref 79–97)
MONOCYTES # BLD AUTO: 0.5 X10E3/UL (ref 0.1–0.9)
MONOCYTES NFR BLD AUTO: 10 %
NEUTROPHILS # BLD AUTO: 3.5 X10E3/UL (ref 1.4–7)
NEUTROPHILS NFR BLD AUTO: 67 %
PLATELET # BLD AUTO: 181 X10E3/UL (ref 150–450)
POTASSIUM SERPL-SCNC: 5 MMOL/L (ref 3.5–5.2)
PROT SERPL-MCNC: 6.8 G/DL (ref 6–8.5)
PSA SERPL-MCNC: 0.4 NG/ML (ref 0–4)
RBC # BLD AUTO: 4.64 X10E6/UL (ref 4.14–5.8)
SODIUM SERPL-SCNC: 139 MMOL/L (ref 134–144)
TRIGL SERPL-MCNC: 40 MG/DL (ref 0–149)
VLDLC SERPL CALC-MCNC: 8 MG/DL (ref 5–40)
WBC # BLD AUTO: 5.3 X10E3/UL (ref 3.4–10.8)

## 2024-02-15 ENCOUNTER — OFFICE VISIT (OUTPATIENT)
Dept: FAMILY MEDICINE | Facility: CLINIC | Age: 64
End: 2024-02-15
Payer: COMMERCIAL

## 2024-02-15 VITALS
SYSTOLIC BLOOD PRESSURE: 120 MMHG | HEART RATE: 78 BPM | TEMPERATURE: 97.6 F | OXYGEN SATURATION: 98 % | DIASTOLIC BLOOD PRESSURE: 60 MMHG | BODY MASS INDEX: 24.91 KG/M2 | WEIGHT: 155 LBS | HEIGHT: 66 IN

## 2024-02-15 DIAGNOSIS — N40.0 BENIGN PROSTATIC HYPERPLASIA WITHOUT LOWER URINARY TRACT SYMPTOMS: ICD-10-CM

## 2024-02-15 DIAGNOSIS — Z00.00 ENCOUNTER FOR PREVENTIVE CARE: Primary | ICD-10-CM

## 2024-02-15 DIAGNOSIS — E83.52 HYPERCALCEMIA: ICD-10-CM

## 2024-02-15 PROBLEM — S70.01XA CONTUSION OF RIGHT HIP: Status: RESOLVED | Noted: 2021-06-23 | Resolved: 2024-02-15

## 2024-02-15 PROBLEM — M54.50 ACUTE LEFT-SIDED LOW BACK PAIN WITHOUT SCIATICA: Status: RESOLVED | Noted: 2022-02-18 | Resolved: 2024-02-15

## 2024-02-15 PROBLEM — R10.11 RIGHT UPPER QUADRANT ABDOMINAL PAIN: Status: RESOLVED | Noted: 2022-07-19 | Resolved: 2024-02-15

## 2024-02-15 PROCEDURE — 90471 IMMUNIZATION ADMIN: CPT | Performed by: FAMILY MEDICINE

## 2024-02-15 PROCEDURE — 99396 PREV VISIT EST AGE 40-64: CPT | Mod: 25 | Performed by: FAMILY MEDICINE

## 2024-02-15 PROCEDURE — 90686 IIV4 VACC NO PRSV 0.5 ML IM: CPT | Performed by: FAMILY MEDICINE

## 2024-02-15 PROCEDURE — 36415 COLL VENOUS BLD VENIPUNCTURE: CPT | Performed by: FAMILY MEDICINE

## 2024-02-15 PROCEDURE — 3008F BODY MASS INDEX DOCD: CPT | Performed by: FAMILY MEDICINE

## 2024-02-15 ASSESSMENT — ENCOUNTER SYMPTOMS
PALPITATIONS: 0
POLYPHAGIA: 0
BACK PAIN: 0
SHORTNESS OF BREATH: 0
ARTHRALGIAS: 0
POLYDIPSIA: 0
CONSTIPATION: 0
FATIGUE: 0
NERVOUS/ANXIOUS: 0
BRUISES/BLEEDS EASILY: 0
DIARRHEA: 0
BLOOD IN STOOL: 0
HEADACHES: 0

## 2024-02-15 ASSESSMENT — PATIENT HEALTH QUESTIONNAIRE - PHQ9: SUM OF ALL RESPONSES TO PHQ9 QUESTIONS 1 & 2: 0

## 2024-02-15 NOTE — PROGRESS NOTES
[place] [id number]  [phone]       Reason for visit:   Chief Complaint   Patient presents with   • Annual Exam      HPI   Elmer Pardo is a 63 y.o. male who presents with CPE. He exercises. Diet is good.      Past Medical History:   Diagnosis Date   • Enlarged prostate      Past Surgical History:   Procedure Laterality Date   • ANTERIOR CRUCIATE LIGAMENT REPAIR     • KNEE CARTILAGE SURGERY Left    • KNEE SURGERY       Social History     Tobacco Use   • Smoking status: Former   • Smokeless tobacco: Never   Substance Use Topics   • Alcohol use: Yes   • Drug use: Yes     Types: Marijuana      Social History     Social History Narrative    Do you wear your seatbelt? Yes    Do you have smoke detector in your home? Yes    Do you have a carbon monoxide detector in your home? 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      Family History   Family history unknown: Yes     Patient has no known allergies.  Current Outpatient Medications   Medication Sig Dispense Refill   • alfuzosin (UROXATRAL) 10 mg 24 hr tablet Take 10 mg by mouth once daily.       No current facility-administered medications for this visit.       Patient Active Problem List    Diagnosis Date Noted   • Hypercalcemia 02/15/2024   • Encounter for preventive care 02/14/2023   • Liver mass 07/21/2022   • Prediabetes 07/19/2022   • Benign prostatic hyperplasia without lower urinary tract symptoms 02/13/2021   • Colon polyp 02/13/2021         Review of Systems   Constitutional: Negative for fatigue.   HENT: Positive for hearing loss.    Eyes: Negative for visual disturbance.   Respiratory: Negative for shortness of breath.    Cardiovascular: Negative for chest pain, palpitations and leg swelling.   Gastrointestinal: Negative for blood in stool, constipation and diarrhea.   Endocrine: Negative for polydipsia, polyphagia and polyuria.   Musculoskeletal:  "Negative for arthralgias and back pain.   Skin: Negative for rash.   Neurological: Negative for headaches.   Hematological: Does not bruise/bleed easily.   Psychiatric/Behavioral: The patient is not nervous/anxious.      Objective   Vitals:    02/15/24 1000   BP: 120/60   BP Location: Left upper arm   Patient Position: Sitting   Pulse: 78   Temp: 36.4 °C (97.6 °F)   TempSrc: Temporal   SpO2: 98%   Weight: 70.3 kg (155 lb)   Height: 1.676 m (5' 6\")     Body mass index is 25.02 kg/m².  Physical Exam  Constitutional:       Appearance: Normal appearance.   HENT:      Right Ear: Tympanic membrane and ear canal normal.      Left Ear: Tympanic membrane and ear canal normal.   Eyes:      Extraocular Movements: Extraocular movements intact.      Conjunctiva/sclera: Conjunctivae normal.      Pupils: Pupils are equal, round, and reactive to light.   Neck:      Thyroid: No thyromegaly.      Vascular: No carotid bruit.   Cardiovascular:      Rate and Rhythm: Normal rate and regular rhythm.      Pulses: Normal pulses.      Heart sounds: Normal heart sounds.   Pulmonary:      Effort: Pulmonary effort is normal.      Breath sounds: Normal breath sounds.   Abdominal:      General: Abdomen is flat. Bowel sounds are normal.      Palpations: Abdomen is soft. There is no hepatomegaly or splenomegaly.   Musculoskeletal:         General: Normal range of motion.      Cervical back: Normal range of motion.      Right lower leg: No edema.      Left lower leg: No edema.   Lymphadenopathy:      Cervical: No cervical adenopathy.   Skin:     General: Skin is warm and dry.   Neurological:      General: No focal deficit present.      Mental Status: He is alert and oriented to person, place, and time.      Deep Tendon Reflexes: Reflexes normal.   Psychiatric:         Mood and Affect: Mood normal.         Behavior: Behavior normal.         Thought Content: Thought content normal.         Judgment: Judgment normal.         Procedures    Lab Results "   Component Value Date    WBC 5.3 02/14/2023    HGB 14.9 02/14/2023    HCT 42.7 02/14/2023     02/14/2023    CHOL 190 02/14/2023    TRIG 40 02/14/2023    HDL 69 02/14/2023    LDLCALC 113 (H) 02/14/2023    ALT 33 02/14/2023    AST 25 02/14/2023     02/14/2023    K 5.0 02/14/2023     02/14/2023    CREATININE 0.98 02/14/2023    BUN 16 02/14/2023    CO2 23 02/14/2023    PSA 0.4 02/14/2023    GLUCOSE 120 (H) 02/14/2023    HGBA1C 5.6 02/14/2023    HEPCAB <0.1 02/14/2022           Assessment   Problem List Items Addressed This Visit        Genitourinary    Benign prostatic hyperplasia without lower urinary tract symptoms     PSA         Relevant Orders    PSA       Other    Encounter for preventive care - Primary     Labs  Flu vax  Colonoscopy 2026         Relevant Orders    CBC and Differential    Comprehensive metabolic panel    Lipid panel    Hemoglobin A1c    HIV 1,2 AB P24 AG    PSA    Hypercalcemia     Labs         Relevant Orders    PTH, intact           Harry A. Frankel, MD  2/15/2024

## 2024-02-16 LAB
ALBUMIN SERPL-MCNC: 4.8 G/DL (ref 3.9–4.9)
ALBUMIN/GLOB SERPL: 2.5 {RATIO} (ref 1.2–2.2)
ALP SERPL-CCNC: 70 IU/L (ref 44–121)
ALT SERPL-CCNC: 22 IU/L (ref 0–44)
AST SERPL-CCNC: 22 IU/L (ref 0–40)
BASOPHILS # BLD AUTO: 0 X10E3/UL (ref 0–0.2)
BASOPHILS NFR BLD AUTO: 1 %
BILIRUB SERPL-MCNC: 0.5 MG/DL (ref 0–1.2)
BUN SERPL-MCNC: 21 MG/DL (ref 8–27)
BUN/CREAT SERPL: 26 (ref 10–24)
CALCIUM SERPL-MCNC: 10 MG/DL (ref 8.6–10.2)
CHLORIDE SERPL-SCNC: 102 MMOL/L (ref 96–106)
CHOLEST SERPL-MCNC: 205 MG/DL (ref 100–199)
CO2 SERPL-SCNC: 24 MMOL/L (ref 20–29)
CREAT SERPL-MCNC: 0.8 MG/DL (ref 0.76–1.27)
EGFRCR SERPLBLD CKD-EPI 2021: 101 ML/MIN/1.73
EOSINOPHIL # BLD AUTO: 0.1 X10E3/UL (ref 0–0.4)
EOSINOPHIL NFR BLD AUTO: 2 %
ERYTHROCYTE [DISTWIDTH] IN BLOOD BY AUTOMATED COUNT: 12.2 % (ref 11.6–15.4)
GLOBULIN SER CALC-MCNC: 1.9 G/DL (ref 1.5–4.5)
GLUCOSE SERPL-MCNC: 110 MG/DL (ref 70–99)
HBA1C MFR BLD: 5.8 % (ref 4.8–5.6)
HCT VFR BLD AUTO: 45.3 % (ref 37.5–51)
HDLC SERPL-MCNC: 83 MG/DL
HGB BLD-MCNC: 15.3 G/DL (ref 13–17.7)
HIV 1+2 AB+HIV1 P24 AG SERPL QL IA: NON REACTIVE
IMM GRANULOCYTES # BLD AUTO: 0 X10E3/UL (ref 0–0.1)
IMM GRANULOCYTES NFR BLD AUTO: 0 %
LDLC SERPL CALC-MCNC: 116 MG/DL (ref 0–99)
LYMPHOCYTES # BLD AUTO: 1 X10E3/UL (ref 0.7–3.1)
LYMPHOCYTES NFR BLD AUTO: 28 %
MCH RBC QN AUTO: 32.2 PG (ref 26.6–33)
MCHC RBC AUTO-ENTMCNC: 33.8 G/DL (ref 31.5–35.7)
MCV RBC AUTO: 95 FL (ref 79–97)
MONOCYTES # BLD AUTO: 0.4 X10E3/UL (ref 0.1–0.9)
MONOCYTES NFR BLD AUTO: 12 %
NEUTROPHILS # BLD AUTO: 2.1 X10E3/UL (ref 1.4–7)
NEUTROPHILS NFR BLD AUTO: 57 %
PLATELET # BLD AUTO: 137 X10E3/UL (ref 150–450)
POTASSIUM SERPL-SCNC: 5.1 MMOL/L (ref 3.5–5.2)
PROT SERPL-MCNC: 6.7 G/DL (ref 6–8.5)
PSA SERPL-MCNC: 0.5 NG/ML (ref 0–4)
RBC # BLD AUTO: 4.75 X10E6/UL (ref 4.14–5.8)
SODIUM SERPL-SCNC: 142 MMOL/L (ref 134–144)
TRIGL SERPL-MCNC: 32 MG/DL (ref 0–149)
VLDLC SERPL CALC-MCNC: 6 MG/DL (ref 5–40)
WBC # BLD AUTO: 3.7 X10E3/UL (ref 3.4–10.8)

## 2024-02-19 LAB — SPECIMEN STATUS: NORMAL

## 2024-02-21 ENCOUNTER — TELEPHONE (OUTPATIENT)
Dept: FAMILY MEDICINE | Facility: CLINIC | Age: 64
End: 2024-02-21
Payer: COMMERCIAL

## 2024-02-21 DIAGNOSIS — E83.52 HYPERCALCEMIA: Primary | ICD-10-CM

## 2024-02-21 NOTE — TELEPHONE ENCOUNTER
Please call. I can send in order to [facility] but he will have to pay for it. I have no idea what the cost is.

## 2024-02-21 NOTE — TELEPHONE ENCOUNTER
Patient calling to say that [facility] is apparently not going to do his PTH order because his calcium is below their threshold. Wants to know if he can have another order for PTH to bring to another lab to have it done. Did not specify a lab.

## 2024-08-14 ENCOUNTER — OFFICE VISIT (OUTPATIENT)
Dept: FAMILY MEDICINE | Facility: CLINIC | Age: 64
End: 2024-08-14
Payer: COMMERCIAL

## 2024-08-14 VITALS
OXYGEN SATURATION: 96 % | SYSTOLIC BLOOD PRESSURE: 134 MMHG | DIASTOLIC BLOOD PRESSURE: 68 MMHG | HEIGHT: 66 IN | HEART RATE: 96 BPM | BODY MASS INDEX: 24.49 KG/M2 | WEIGHT: 152.4 LBS | TEMPERATURE: 97.2 F

## 2024-08-14 DIAGNOSIS — N40.0 BENIGN PROSTATIC HYPERPLASIA WITHOUT LOWER URINARY TRACT SYMPTOMS: ICD-10-CM

## 2024-08-14 DIAGNOSIS — D69.6 LOW PLATELET COUNT (CMS/HCC): ICD-10-CM

## 2024-08-14 DIAGNOSIS — R73.03 PREDIABETES: Primary | ICD-10-CM

## 2024-08-14 PROCEDURE — 99214 OFFICE O/P EST MOD 30 MIN: CPT | Mod: 25 | Performed by: FAMILY MEDICINE

## 2024-08-14 PROCEDURE — 3008F BODY MASS INDEX DOCD: CPT | Performed by: FAMILY MEDICINE

## 2024-08-14 PROCEDURE — 36415 COLL VENOUS BLD VENIPUNCTURE: CPT | Performed by: FAMILY MEDICINE

## 2024-08-14 RX ORDER — LORAZEPAM 0.5 MG/1
0.5 TABLET ORAL DAILY
Qty: 5 TABLET | Refills: 0 | Status: SHIPPED | OUTPATIENT
Start: 2024-08-14 | End: 2025-03-11 | Stop reason: SDUPTHER

## 2024-08-14 ASSESSMENT — ENCOUNTER SYMPTOMS: DYSURIA: 1

## 2024-08-14 NOTE — PROGRESS NOTES
[address] [address]  [phone]       Reason for visit:   Chief Complaint   Patient presents with    Follow-up      HPI   Elmer Pardo is a 63 y.o. male who presents with low platelets and prediabetes. He is not watching his diet. He has problems urinating on plane. Maybe related to his prostate. Compliant with medications. His chronic medical conditions are stable.      Past Medical History:   Diagnosis Date    Enlarged prostate      Past Surgical History:   Procedure Laterality Date    ANTERIOR CRUCIATE LIGAMENT REPAIR      KNEE CARTILAGE SURGERY Left     KNEE SURGERY       Social History     Tobacco Use    Smoking status: Former    Smokeless tobacco: Never   Substance Use Topics    Alcohol use: Yes    Drug use: Yes     Types: Marijuana      Social History     Social History Narrative    Do you wear your seatbelt? Yes    Do you have smoke detector in your home? Yes    Do you have a carbon monoxide detector in your home? 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      Family History   Family history unknown: Yes     Patient has no known allergies.  Current Outpatient Medications   Medication Sig Dispense Refill    alfuzosin (UROXATRAL) 10 mg 24 hr tablet Take 10 mg by mouth once daily.       No current facility-administered medications for this visit.       Patient Active Problem List    Diagnosis Date Noted    Low platelet count (CMS/HCC) 08/14/2024    Hypercalcemia 02/15/2024    Encounter for preventive care 02/14/2023    Liver mass 07/21/2022    Prediabetes 07/19/2022    Benign prostatic hyperplasia without lower urinary tract symptoms 02/13/2021    Colon polyp 02/13/2021         Review of Systems   Genitourinary:  Positive for dysuria.     Objective   Vitals:    08/14/24 1022   BP: 134/68   BP Location: Left upper arm   Patient Position: Sitting   Pulse: 96   Temp: 36.2 °C (97.2 °F)   TempSrc: Temporal  "  SpO2: 96%   Weight: 69.1 kg (152 lb 6.4 oz)   Height: 1.676 m (5' 6\")     Body mass index is 24.6 kg/m².  Physical Exam  Constitutional:       Appearance: Normal appearance.   Cardiovascular:      Rate and Rhythm: Normal rate and regular rhythm.      Pulses: Normal pulses.      Heart sounds: Normal heart sounds.   Pulmonary:      Effort: Pulmonary effort is normal.      Breath sounds: Normal breath sounds.   Neurological:      Mental Status: He is alert.         Procedures    Lab Results   Component Value Date    WBC 3.7 02/15/2024    HGB 15.3 02/15/2024    HCT 45.3 02/15/2024     (L) 02/15/2024    CHOL 205 (H) 02/15/2024    TRIG 32 02/15/2024    HDL 83 02/15/2024    LDLCALC 116 (H) 02/15/2024    ALT 22 02/15/2024    AST 22 02/15/2024     02/15/2024    K 5.1 02/15/2024     02/15/2024    CREATININE 0.80 02/15/2024    BUN 21 02/15/2024    CO2 24 02/15/2024    PSA 0.5 02/15/2024    GLUCOSE 110 (H) 02/15/2024    HGBA1C 5.8 (H) 02/15/2024    HEPCAB <0.1 02/14/2022           Assessment   Problem List Items Addressed This Visit       Benign prostatic hyperplasia without lower urinary tract symptoms     Stable  Continue meds  Low dose Ativan for plane travel         Prediabetes - Primary     Stable  Labs  Diet mods.  He exercises daily         Relevant Orders    Hemoglobin A1c    Low platelet count (CMS/HCC)     Labs         Relevant Orders    CBC and Differential           Harry A. Frankel, MD  8/14/2024                   "

## 2024-08-16 LAB
BASOPHILS # BLD AUTO: 0 X10E3/UL (ref 0–0.2)
BASOPHILS NFR BLD AUTO: 1 %
EOSINOPHIL # BLD AUTO: 0 X10E3/UL (ref 0–0.4)
EOSINOPHIL NFR BLD AUTO: 1 %
ERYTHROCYTE [DISTWIDTH] IN BLOOD BY AUTOMATED COUNT: 12.1 % (ref 11.6–15.4)
HBA1C MFR BLD: 5.8 % (ref 4.8–5.6)
HCT VFR BLD AUTO: 45.8 % (ref 37.5–51)
HGB BLD-MCNC: 14.9 G/DL (ref 13–17.7)
IMM GRANULOCYTES # BLD AUTO: 0.1 X10E3/UL (ref 0–0.1)
IMM GRANULOCYTES NFR BLD AUTO: 1 %
LYMPHOCYTES # BLD AUTO: 1 X10E3/UL (ref 0.7–3.1)
LYMPHOCYTES NFR BLD AUTO: 24 %
MCH RBC QN AUTO: 32 PG (ref 26.6–33)
MCHC RBC AUTO-ENTMCNC: 32.5 G/DL (ref 31.5–35.7)
MCV RBC AUTO: 99 FL (ref 79–97)
MONOCYTES # BLD AUTO: 0.5 X10E3/UL (ref 0.1–0.9)
MONOCYTES NFR BLD AUTO: 11 %
NEUTROPHILS # BLD AUTO: 2.6 X10E3/UL (ref 1.4–7)
NEUTROPHILS NFR BLD AUTO: 62 %
PLATELET # BLD AUTO: 132 X10E3/UL (ref 150–450)
RBC # BLD AUTO: 4.65 X10E6/UL (ref 4.14–5.8)
WBC # BLD AUTO: 4.3 X10E3/UL (ref 3.4–10.8)

## 2024-09-13 ENCOUNTER — OFFICE VISIT (OUTPATIENT)
Dept: FAMILY MEDICINE | Facility: CLINIC | Age: 64
End: 2024-09-13
Payer: COMMERCIAL

## 2024-09-13 VITALS
HEART RATE: 89 BPM | HEIGHT: 66 IN | BODY MASS INDEX: 24.43 KG/M2 | OXYGEN SATURATION: 97 % | DIASTOLIC BLOOD PRESSURE: 80 MMHG | TEMPERATURE: 97.6 F | SYSTOLIC BLOOD PRESSURE: 122 MMHG | WEIGHT: 152 LBS

## 2024-09-13 DIAGNOSIS — R19.02 ABDOMINAL WALL MASS OF LEFT UPPER QUADRANT: Primary | ICD-10-CM

## 2024-09-13 PROCEDURE — 3008F BODY MASS INDEX DOCD: CPT | Performed by: FAMILY MEDICINE

## 2024-09-13 PROCEDURE — 99213 OFFICE O/P EST LOW 20 MIN: CPT | Performed by: FAMILY MEDICINE

## 2024-09-13 ASSESSMENT — ENCOUNTER SYMPTOMS: ABDOMINAL PAIN: 0

## 2024-09-13 NOTE — PROGRESS NOTES
[address] [address]  [phone]       Reason for visit:   Chief Complaint   Patient presents with    Illness      HPI   Elmer Pardo is a 63 y.o. male who presents with asymetry of left upper abdomen. No pain. First noticed a few weeks ago.      Past Medical History:   Diagnosis Date    Enlarged prostate      Past Surgical History:   Procedure Laterality Date    ANTERIOR CRUCIATE LIGAMENT REPAIR      KNEE CARTILAGE SURGERY Left     KNEE SURGERY       Social History     Tobacco Use    Smoking status: Former    Smokeless tobacco: Never   Substance Use Topics    Alcohol use: Yes    Drug use: Yes     Types: Marijuana      Social History     Social History Narrative    Do you wear your seatbelt? Yes    Do you have smoke detector in your home? Yes    Do you have a carbon monoxide detector in your home? 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      Family History   Family history unknown: Yes     Patient has no known allergies.  Current Outpatient Medications   Medication Sig Dispense Refill    alfuzosin (UROXATRAL) 10 mg 24 hr tablet Take 10 mg by mouth once daily.      LORazepam (ATIVAN) 0.5 mg tablet Take 1 tablet (0.5 mg total) by mouth daily. As needed for air travel 5 tablet 0     No current facility-administered medications for this visit.       Patient Active Problem List    Diagnosis Date Noted    Abdominal wall mass of left upper quadrant 09/13/2024    Low platelet count (CMS/HCC) 08/14/2024    Hypercalcemia 02/15/2024    Encounter for preventive care 02/14/2023    Liver mass 07/21/2022    Prediabetes 07/19/2022    Benign prostatic hyperplasia without lower urinary tract symptoms 02/13/2021    Colon polyp 02/13/2021         Review of Systems   Gastrointestinal:  Negative for abdominal pain.     Objective   Vitals:    09/13/24 1411   BP: 122/80   BP Location: Left upper arm   Patient Position: Sitting  "  Pulse: 89   Temp: 36.4 °C (97.6 °F)   TempSrc: Temporal   SpO2: 97%   Weight: 68.9 kg (152 lb)   Height: 1.676 m (5' 6\")     Body mass index is 24.53 kg/m².  Physical Exam  Abdominal:      Comments: He has some superficial fullness on left upper quad when he stands         Procedures    Lab Results   Component Value Date    WBC 4.3 08/14/2024    HGB 14.9 08/14/2024    HCT 45.8 08/14/2024     (L) 08/14/2024    CHOL 205 (H) 02/15/2024    TRIG 32 02/15/2024    HDL 83 02/15/2024    LDLCALC 116 (H) 02/15/2024    ALT 22 02/15/2024    AST 22 02/15/2024     02/15/2024    K 5.1 02/15/2024     02/15/2024    CREATININE 0.80 02/15/2024    BUN 21 02/15/2024    CO2 24 02/15/2024    PSA 0.5 02/15/2024    GLUCOSE 110 (H) 02/15/2024    HGBA1C 5.8 (H) 08/14/2024    HEPCAB <0.1 02/14/2022           Assessment   Problem List Items Addressed This Visit       Abdominal wall mass of left upper quadrant - Primary     I suspect muscular asymetry  US at [place]         Relevant Orders    ULTRASOUND ABDOMEN COMPLETE           Harry A. Frankel, MD  9/13/2024                   "

## 2024-09-19 ENCOUNTER — HOSPITAL ENCOUNTER (OUTPATIENT)
Dept: RADIOLOGY | Facility: CLINIC | Age: 64
Discharge: HOME | End: 2024-09-19
Attending: FAMILY MEDICINE
Payer: COMMERCIAL

## 2024-09-19 DIAGNOSIS — R19.02 ABDOMINAL WALL MASS OF LEFT UPPER QUADRANT: ICD-10-CM

## 2024-09-19 PROCEDURE — 76705 ECHO EXAM OF ABDOMEN: CPT

## 2025-03-11 ENCOUNTER — OFFICE VISIT (OUTPATIENT)
Dept: FAMILY MEDICINE | Facility: CLINIC | Age: 65
End: 2025-03-11
Payer: COMMERCIAL

## 2025-03-11 VITALS
SYSTOLIC BLOOD PRESSURE: 123 MMHG | DIASTOLIC BLOOD PRESSURE: 82 MMHG | OXYGEN SATURATION: 95 % | HEART RATE: 83 BPM | HEIGHT: 66 IN | WEIGHT: 161.2 LBS | TEMPERATURE: 97 F | BODY MASS INDEX: 25.91 KG/M2

## 2025-03-11 DIAGNOSIS — R73.03 PREDIABETES: ICD-10-CM

## 2025-03-11 DIAGNOSIS — D69.6 LOW PLATELET COUNT (CMS/HCC): ICD-10-CM

## 2025-03-11 DIAGNOSIS — Z00.00 ENCOUNTER FOR PREVENTIVE CARE: Primary | ICD-10-CM

## 2025-03-11 DIAGNOSIS — N40.0 BENIGN PROSTATIC HYPERPLASIA WITHOUT LOWER URINARY TRACT SYMPTOMS: ICD-10-CM

## 2025-03-11 DIAGNOSIS — K63.5 POLYP OF COLON, UNSPECIFIED PART OF COLON, UNSPECIFIED TYPE: ICD-10-CM

## 2025-03-11 PROCEDURE — 99396 PREV VISIT EST AGE 40-64: CPT | Performed by: FAMILY MEDICINE

## 2025-03-11 PROCEDURE — 3008F BODY MASS INDEX DOCD: CPT | Performed by: FAMILY MEDICINE

## 2025-03-11 RX ORDER — LORAZEPAM 0.5 MG/1
0.5 TABLET ORAL DAILY
Qty: 20 TABLET | Refills: 0 | Status: SHIPPED | OUTPATIENT
Start: 2025-03-11 | End: 2025-03-31

## 2025-03-11 ASSESSMENT — ENCOUNTER SYMPTOMS
CONSTIPATION: 0
ARTHRALGIAS: 0
BACK PAIN: 0
SHORTNESS OF BREATH: 0
FATIGUE: 0
DIARRHEA: 0
HEADACHES: 0
POLYDIPSIA: 0
POLYPHAGIA: 0
PALPITATIONS: 0
BRUISES/BLEEDS EASILY: 0
BLOOD IN STOOL: 0
NERVOUS/ANXIOUS: 0

## 2025-03-11 ASSESSMENT — PATIENT HEALTH QUESTIONNAIRE - PHQ9: SUM OF ALL RESPONSES TO PHQ9 QUESTIONS 1 & 2: 0

## 2025-03-11 NOTE — PROGRESS NOTES
[place] [id number]  [phone]       Reason for visit:   Chief Complaint   Patient presents with    Annual Exam      HPI   Elmer Pardo is a 64 y.o. male who presents with CPE. He has BPH, prediabetes and colon polyp. He is exercising. Too much carbohydrates.      Past Medical History:   Diagnosis Date    Enlarged prostate      Past Surgical History   Procedure Laterality Date    Anterior cruciate ligament repair      Knee cartilage surgery Left     Knee surgery       Social History     Tobacco Use    Smoking status: Former    Smokeless tobacco: Never   Substance Use Topics    Alcohol use: Yes    Drug use: Yes     Types: Marijuana      Social History     Social History Narrative    Do you wear your seatbelt? Yes    Do you have smoke detector in your home? Yes    Do you have a carbon monoxide detector in your home? 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      Family History   Family history unknown: Yes     Patient has no known allergies.  Current Outpatient Medications   Medication Sig Dispense Refill    alfuzosin (UROXATRAL) 10 mg 24 hr tablet Take 10 mg by mouth once daily.      LORazepam (ATIVAN) 0.5 mg tablet Take 1 tablet (0.5 mg total) by mouth daily for 20 days. As needed for air travel 20 tablet 0     No current facility-administered medications for this visit.       Patient Active Problem List    Diagnosis Date Noted    Abdominal wall mass of left upper quadrant 09/13/2024    Low platelet count (CMS/HCC) 08/14/2024    Hypercalcemia 02/15/2024    Encounter for preventive care 02/14/2023    Liver mass 07/21/2022    Prediabetes 07/19/2022    Benign prostatic hyperplasia without lower urinary tract symptoms 02/13/2021    Colon polyp 02/13/2021         Review of Systems   Constitutional:  Negative for fatigue.   HENT:  Negative for hearing loss.    Eyes:  Negative for visual disturbance.  "  Respiratory:  Negative for shortness of breath.    Cardiovascular:  Negative for chest pain, palpitations and leg swelling.   Gastrointestinal:  Negative for blood in stool, constipation and diarrhea.   Endocrine: Negative for polydipsia, polyphagia and polyuria.   Musculoskeletal:  Negative for arthralgias and back pain.   Skin:  Negative for rash.   Neurological:  Negative for headaches.   Hematological:  Does not bruise/bleed easily.   Psychiatric/Behavioral:  The patient is not nervous/anxious.      Objective   Vitals:    03/11/25 0933   BP: 123/82   BP Location: Left upper arm   Patient Position: Sitting   Pulse: 83   Temp: 36.1 °C (97 °F)   TempSrc: Temporal   SpO2: 95%   Weight: 73.1 kg (161 lb 3.2 oz)   Height: 1.676 m (5' 6\")     Body mass index is 26.02 kg/m².  Physical Exam  Constitutional:       Appearance: Normal appearance.   HENT:      Right Ear: Tympanic membrane and ear canal normal.      Left Ear: Tympanic membrane and ear canal normal.   Eyes:      Extraocular Movements: Extraocular movements intact.      Conjunctiva/sclera: Conjunctivae normal.      Pupils: Pupils are equal, round, and reactive to light.   Neck:      Thyroid: No thyromegaly.      Vascular: No carotid bruit.   Cardiovascular:      Rate and Rhythm: Normal rate and regular rhythm.      Pulses: Normal pulses.      Heart sounds: Normal heart sounds.   Pulmonary:      Effort: Pulmonary effort is normal.      Breath sounds: Normal breath sounds.   Abdominal:      General: Abdomen is flat. Bowel sounds are normal.      Palpations: Abdomen is soft. There is no hepatomegaly or splenomegaly.   Musculoskeletal:         General: Normal range of motion.      Cervical back: Normal range of motion.      Right lower leg: No edema.      Left lower leg: No edema.   Lymphadenopathy:      Cervical: No cervical adenopathy.   Skin:     General: Skin is warm and dry.   Neurological:      General: No focal deficit present.      Mental Status: He is " alert and oriented to person, place, and time.      Deep Tendon Reflexes: Reflexes normal.   Psychiatric:         Mood and Affect: Mood normal.         Behavior: Behavior normal.         Thought Content: Thought content normal.         Judgment: Judgment normal.         Procedures    Lab Results   Component Value Date    WBC 4.3 08/14/2024    HGB 14.9 08/14/2024    HCT 45.8 08/14/2024     (L) 08/14/2024    CHOL 205 (H) 02/15/2024    TRIG 32 02/15/2024    HDL 83 02/15/2024    LDLCALC 116 (H) 02/15/2024    ALT 22 02/15/2024    AST 22 02/15/2024     02/15/2024    K 5.1 02/15/2024     02/15/2024    CREATININE 0.80 02/15/2024    BUN 21 02/15/2024    CO2 24 02/15/2024    PSA 0.5 02/15/2024    GLUCOSE 110 (H) 02/15/2024    HGBA1C 5.8 (H) 08/14/2024    HEPCAB <0.1 02/14/2022           Assessment   Problem List Items Addressed This Visit       Benign prostatic hyperplasia without lower urinary tract symptoms     Continue meds  Follow with urology  PSA         Colon polyp     Colonoscopy 2026         Prediabetes     HBA1C         Encounter for preventive care - Primary     Labs  Other services are up to date         Relevant Orders    CBC and differential    Comprehensive metabolic panel    Hemoglobin A1c    Lipid panel    PSA    Low platelet count (CMS/HCC)     CBC                Harry A. Frankel, MD  3/11/2025

## 2025-03-13 LAB
BASOPHILS # BLD AUTO: 0 X10E3/UL (ref 0–0.2)
BASOPHILS NFR BLD AUTO: 1 %
EOSINOPHIL # BLD AUTO: 0.1 X10E3/UL (ref 0–0.4)
EOSINOPHIL NFR BLD AUTO: 3 %
ERYTHROCYTE [DISTWIDTH] IN BLOOD BY AUTOMATED COUNT: 12.2 % (ref 11.6–15.4)
HCT VFR BLD AUTO: 47.1 % (ref 37.5–51)
HGB BLD-MCNC: 15.5 G/DL (ref 13–17.7)
IMM GRANULOCYTES # BLD AUTO: 0 X10E3/UL (ref 0–0.1)
IMM GRANULOCYTES NFR BLD AUTO: 0 %
LYMPHOCYTES # BLD AUTO: 1.3 X10E3/UL (ref 0.7–3.1)
LYMPHOCYTES NFR BLD AUTO: 30 %
MCH RBC QN AUTO: 31.9 PG (ref 26.6–33)
MCHC RBC AUTO-ENTMCNC: 32.9 G/DL (ref 31.5–35.7)
MCV RBC AUTO: 97 FL (ref 79–97)
MONOCYTES # BLD AUTO: 0.5 X10E3/UL (ref 0.1–0.9)
MONOCYTES NFR BLD AUTO: 11 %
NEUTROPHILS # BLD AUTO: 2.5 X10E3/UL (ref 1.4–7)
NEUTROPHILS NFR BLD AUTO: 55 %
PLATELET # BLD AUTO: 140 X10E3/UL (ref 150–450)
RBC # BLD AUTO: 4.86 X10E6/UL (ref 4.14–5.8)
WBC # BLD AUTO: 4.5 X10E3/UL (ref 3.4–10.8)

## 2025-03-14 LAB
ALBUMIN SERPL-MCNC: 4.6 G/DL (ref 3.9–4.9)
ALP SERPL-CCNC: 76 IU/L (ref 44–121)
ALT SERPL-CCNC: 22 IU/L (ref 0–44)
AST SERPL-CCNC: 22 IU/L (ref 0–40)
BILIRUB SERPL-MCNC: 0.5 MG/DL (ref 0–1.2)
BUN SERPL-MCNC: 17 MG/DL (ref 8–27)
BUN/CREAT SERPL: 18 (ref 10–24)
CALCIUM SERPL-MCNC: 10.4 MG/DL (ref 8.6–10.2)
CHLORIDE SERPL-SCNC: 101 MMOL/L (ref 96–106)
CHOLEST SERPL-MCNC: 239 MG/DL (ref 100–199)
CO2 SERPL-SCNC: 26 MMOL/L (ref 20–29)
CREAT SERPL-MCNC: 0.96 MG/DL (ref 0.76–1.27)
EGFRCR SERPLBLD CKD-EPI 2021: 89 ML/MIN/1.73
GLOBULIN SER CALC-MCNC: 2.2 G/DL (ref 1.5–4.5)
GLUCOSE SERPL-MCNC: 114 MG/DL (ref 70–99)
HBA1C MFR BLD: 5.7 % (ref 4.8–5.6)
HDLC SERPL-MCNC: 86 MG/DL
LDLC SERPL CALC-MCNC: 146 MG/DL (ref 0–99)
POTASSIUM SERPL-SCNC: 5.9 MMOL/L (ref 3.5–5.2)
PROT SERPL-MCNC: 6.8 G/DL (ref 6–8.5)
PSA SERPL-MCNC: 0.6 NG/ML (ref 0–4)
SODIUM SERPL-SCNC: 140 MMOL/L (ref 134–144)
TRIGL SERPL-MCNC: 41 MG/DL (ref 0–149)
VLDLC SERPL CALC-MCNC: 7 MG/DL (ref 5–40)